# Patient Record
Sex: MALE | Race: WHITE | NOT HISPANIC OR LATINO | Employment: OTHER | URBAN - METROPOLITAN AREA
[De-identification: names, ages, dates, MRNs, and addresses within clinical notes are randomized per-mention and may not be internally consistent; named-entity substitution may affect disease eponyms.]

---

## 2020-06-23 ENCOUNTER — LAB VISIT (OUTPATIENT)
Dept: PRIMARY CARE CLINIC | Facility: OTHER | Age: 20
End: 2020-06-23
Payer: COMMERCIAL

## 2020-06-23 DIAGNOSIS — Z03.818 ENCOUNTER FOR OBSERVATION FOR SUSPECTED EXPOSURE TO OTHER BIOLOGICAL AGENTS RULED OUT: Primary | ICD-10-CM

## 2020-06-23 PROCEDURE — U0003 INFECTIOUS AGENT DETECTION BY NUCLEIC ACID (DNA OR RNA); SEVERE ACUTE RESPIRATORY SYNDROME CORONAVIRUS 2 (SARS-COV-2) (CORONAVIRUS DISEASE [COVID-19]), AMPLIFIED PROBE TECHNIQUE, MAKING USE OF HIGH THROUGHPUT TECHNOLOGIES AS DESCRIBED BY CMS-2020-01-R: HCPCS

## 2020-06-25 LAB — SARS-COV-2 RNA RESP QL NAA+PROBE: NOT DETECTED

## 2020-07-09 ENCOUNTER — LAB VISIT (OUTPATIENT)
Dept: PRIMARY CARE CLINIC | Facility: OTHER | Age: 20
End: 2020-07-09
Attending: INTERNAL MEDICINE
Payer: COMMERCIAL

## 2020-07-09 DIAGNOSIS — Z11.59 SPECIAL SCREENING EXAMINATION FOR UNSPECIFIED VIRAL DISEASE: ICD-10-CM

## 2020-07-09 PROCEDURE — U0003 INFECTIOUS AGENT DETECTION BY NUCLEIC ACID (DNA OR RNA); SEVERE ACUTE RESPIRATORY SYNDROME CORONAVIRUS 2 (SARS-COV-2) (CORONAVIRUS DISEASE [COVID-19]), AMPLIFIED PROBE TECHNIQUE, MAKING USE OF HIGH THROUGHPUT TECHNOLOGIES AS DESCRIBED BY CMS-2020-01-R: HCPCS

## 2020-07-12 LAB — SARS-COV-2 RNA RESP QL NAA+PROBE: NEGATIVE

## 2020-10-13 ENCOUNTER — LAB VISIT (OUTPATIENT)
Dept: PRIMARY CARE CLINIC | Facility: OTHER | Age: 20
End: 2020-10-13
Attending: INTERNAL MEDICINE
Payer: COMMERCIAL

## 2020-10-13 DIAGNOSIS — R50.9 FEVER: ICD-10-CM

## 2020-10-13 DIAGNOSIS — Z03.818 ENCOUNTER FOR OBSERVATION FOR SUSPECTED EXPOSURE TO OTHER BIOLOGICAL AGENTS RULED OUT: ICD-10-CM

## 2020-10-13 DIAGNOSIS — R05.9 COUGH: ICD-10-CM

## 2020-10-13 DIAGNOSIS — R06.02 SHORTNESS OF BREATH: ICD-10-CM

## 2020-10-13 PROCEDURE — U0003 INFECTIOUS AGENT DETECTION BY NUCLEIC ACID (DNA OR RNA); SEVERE ACUTE RESPIRATORY SYNDROME CORONAVIRUS 2 (SARS-COV-2) (CORONAVIRUS DISEASE [COVID-19]), AMPLIFIED PROBE TECHNIQUE, MAKING USE OF HIGH THROUGHPUT TECHNOLOGIES AS DESCRIBED BY CMS-2020-01-R: HCPCS

## 2020-10-14 LAB — SARS-COV-2 RNA RESP QL NAA+PROBE: NOT DETECTED

## 2021-05-06 ENCOUNTER — PATIENT MESSAGE (OUTPATIENT)
Dept: RESEARCH | Facility: HOSPITAL | Age: 21
End: 2021-05-06

## 2023-04-22 ENCOUNTER — HOSPITAL ENCOUNTER (EMERGENCY)
Facility: HOSPITAL | Age: 23
Discharge: HOME | End: 2023-04-22
Attending: STUDENT IN AN ORGANIZED HEALTH CARE EDUCATION/TRAINING PROGRAM
Payer: OTHER MISCELLANEOUS

## 2023-04-22 VITALS
DIASTOLIC BLOOD PRESSURE: 89 MMHG | SYSTOLIC BLOOD PRESSURE: 160 MMHG | RESPIRATION RATE: 18 BRPM | BODY MASS INDEX: 27.92 KG/M2 | TEMPERATURE: 98 F | WEIGHT: 195 LBS | OXYGEN SATURATION: 100 % | HEART RATE: 103 BPM | HEIGHT: 70 IN

## 2023-04-22 DIAGNOSIS — S93.402A SPRAIN OF LEFT ANKLE, UNSPECIFIED LIGAMENT, INITIAL ENCOUNTER: Primary | ICD-10-CM

## 2023-04-22 DIAGNOSIS — M25.571 RIGHT ANKLE PAIN: ICD-10-CM

## 2023-04-22 PROCEDURE — 25000003 PHARM REV CODE 250: Performed by: STUDENT IN AN ORGANIZED HEALTH CARE EDUCATION/TRAINING PROGRAM

## 2023-04-22 PROCEDURE — 99283 EMERGENCY DEPT VISIT LOW MDM: CPT

## 2023-04-22 RX ORDER — IBUPROFEN 400 MG/1
800 TABLET ORAL
Status: COMPLETED | OUTPATIENT
Start: 2023-04-22 | End: 2023-04-22

## 2023-04-22 RX ADMIN — IBUPROFEN 800 MG: 400 TABLET ORAL at 02:04

## 2023-04-22 NOTE — ED TRIAGE NOTES
Pt states he was at drill. Pt stated they were doing exercising requiring pt to jump up and down; rollled his right ankle. Pt states a pain of 5 out of 10. Respirations even and non labored, skin warm, dry and intact. Will continue to monitor

## 2023-04-22 NOTE — DISCHARGE INSTRUCTIONS
Rest ice elevate extremity  Motrin for pain and swelling  Follow up as directed   Return for any concerns

## 2023-04-22 NOTE — Clinical Note
"Robert"Kyung Tejeda was seen and treated in our emergency department on 4/22/2023.  He may return to work on 04/24/2023.  May return to work however must refrain from any physical activity until follow-up with orthopedist released to do so     If you have any questions or concerns, please don't hesitate to call.      JLUIS Cole"

## 2024-11-05 NOTE — ED PROVIDER NOTES
Encounter Date: 4/22/2023       History     Chief Complaint   Patient presents with    Ankle Pain     23-year-old male presents to the emergency department with complaint of left ankle pain.  Patient reports that he was doing some agility exercises in training states that he was jumping up and down reports that he had a high jump and twisted his ankle complaining of pain to the medial aspect of the left ankle denies any previous injury.    Review of patient's allergies indicates:   Allergen Reactions    Latex, natural rubber      No past medical history on file.  Past Surgical History:   Procedure Laterality Date    gi scope  2008     Family History   Problem Relation Age of Onset    Heart disease Maternal Grandmother     Diabetes Maternal Grandfather     Stroke Maternal Grandfather     Cancer Maternal Grandfather      Social History     Tobacco Use    Smoking status: Never   Substance Use Topics    Alcohol use: No    Drug use: No     Review of Systems   Constitutional: Negative.    HENT: Negative.     Respiratory: Negative.     Cardiovascular: Negative.    Gastrointestinal: Negative.    Genitourinary: Negative.    Musculoskeletal:         Left ankle pain   Neurological: Negative.    Hematological: Negative.    Psychiatric/Behavioral: Negative.     All other systems reviewed and are negative.    Physical Exam     Initial Vitals   BP Pulse Resp Temp SpO2   04/22/23 1348 04/22/23 1348 04/22/23 1348 04/22/23 1408 04/22/23 1348   (!) 160/89 103 18 98.2 °F (36.8 °C) 100 %      MAP       --                Physical Exam    Nursing note and vitals reviewed.  Constitutional: He appears well-developed and well-nourished.   Musculoskeletal:      Left ankle: Tenderness present over the medial malleolus. Decreased range of motion.      Left Achilles Tendon: No tenderness. Soria's test negative.     Neurological: He is alert and oriented to person, place, and time. He has normal strength.   Skin: Skin is warm and dry. No rash  noted.       ED Course   Splint Application    Date/Time: 4/22/2023 3:00 PM  Performed by: JLUIS Cole  Authorized by: Oliva Tan MD   Location details: left ankle  Supplies used: elastic bandage  Post-procedure: The splinted body part was neurovascularly unchanged following the procedure.  Patient tolerance: Patient tolerated the procedure well with no immediate complications      Labs Reviewed - No data to display       Imaging Results              X-Ray Ankle Complete Left (In process)  Result time 04/22/23 14:13:23   Procedure changed from X-Ray Ankle Complete Right                    Medications   ibuprofen tablet 800 mg (800 mg Oral Given 4/22/23 1414)     Medical Decision Making:   History:   Old Records Summarized: records from previous admission(s).  Initial Assessment:   23-year-old male presents to the emergency department with complaint of left ankle pain.  Patient reports that he was doing some agility exercises in training states that he was jumping up and down reports that he had a high jump and twisted his ankle complaining of pain to the medial aspect of the left ankle denies any previous injury.    Differential Diagnosis:   Considerations include sprain, fracture, contusion  Clinical Tests:   Radiological Study: Ordered and Reviewed  ED Management:  23-year-old male presents emergency department reports that he was doing some agility exercise training when he jumped up and twisted his ankle patient complaining of tenderness to the medial aspect of the left ankle x-ray imaging reviewed by me no acute fracture noted.  Patient placed in a Ace wrap, given crutches instructed on rest ice Motrin reports to follow-up with orthopedics for definitive care given return precautions                        Clinical Impression:   Final diagnoses:  [M25.571] Right ankle pain  [S93.402A] Sprain of left ankle, unspecified ligament, initial encounter (Primary)        ED Disposition Condition     Discharge Stable          ED Prescriptions    None       Follow-up Information       Follow up With Specialties Details Why Contact Info    Rashid Xiong MD Sports Medicine, Orthopedic Surgery Schedule an appointment as soon as possible for a visit in 1 week  [address] [place] [address] DR Davison [address] [place] [id number]  [phone]               Conchita Gaspar, JLUIS  04/22/23 1502       JLUIS Cole  04/22/23 1505     Detail Level: Detailed Quality 226: Preventive Care And Screening: Tobacco Use: Screening And Cessation Intervention: Patient screened for tobacco use and is an ex/non-smoker